# Patient Record
Sex: FEMALE | Race: WHITE | Employment: OTHER | URBAN - METROPOLITAN AREA
[De-identification: names, ages, dates, MRNs, and addresses within clinical notes are randomized per-mention and may not be internally consistent; named-entity substitution may affect disease eponyms.]

---

## 2019-06-11 PROBLEM — D51.8 MACROCYTIC ANEMIA WITH VITAMIN B12 DEFICIENCY: Status: ACTIVE | Noted: 2017-12-12

## 2019-06-11 PROBLEM — N39.41 URGE INCONTINENCE: Status: ACTIVE | Noted: 2019-02-28

## 2019-06-11 PROBLEM — I10 ESSENTIAL (PRIMARY) HYPERTENSION: Status: ACTIVE | Noted: 2017-09-22

## 2019-06-11 PROBLEM — E78.2 MIXED HYPERLIPIDEMIA: Status: ACTIVE | Noted: 2017-10-26

## 2019-06-11 PROBLEM — R35.1 NOCTURIA: Status: ACTIVE | Noted: 2019-01-17

## 2019-06-11 PROBLEM — F31.60 BIPOLAR AFFECTIVE DISORDER, CURRENT EPISODE MIXED (HCC): Status: ACTIVE | Noted: 2017-09-22

## 2019-06-11 PROBLEM — R39.15 URGENCY OF URINATION: Status: ACTIVE | Noted: 2018-06-04

## 2019-06-11 PROBLEM — D50.9 IRON DEFICIENCY ANEMIA: Status: ACTIVE | Noted: 2017-10-11

## 2019-06-11 PROBLEM — T50.905A DRUG-INDUCED MALABSORPTION: Status: ACTIVE | Noted: 2019-03-13

## 2019-06-11 PROBLEM — R35.0 FREQUENCY OF MICTURITION: Status: ACTIVE | Noted: 2019-01-17

## 2019-06-11 PROBLEM — R53.81 MALAISE AND FATIGUE: Status: ACTIVE | Noted: 2018-10-15

## 2019-06-11 PROBLEM — N20.0 RENAL CALCULUS: Status: ACTIVE | Noted: 2017-08-28

## 2019-06-11 PROBLEM — N39.0 UTI (URINARY TRACT INFECTION): Status: ACTIVE | Noted: 2017-08-07

## 2019-06-11 PROBLEM — B35.4 TINEA CORPORIS: Status: ACTIVE | Noted: 2019-04-24

## 2019-06-11 PROBLEM — E03.9 ACQUIRED HYPOTHYROIDISM: Status: ACTIVE | Noted: 2017-09-22

## 2019-06-11 PROBLEM — N39.3 SUI (STRESS URINARY INCONTINENCE, FEMALE): Status: ACTIVE | Noted: 2019-02-28

## 2019-06-11 PROBLEM — R53.83 MALAISE AND FATIGUE: Status: ACTIVE | Noted: 2018-10-15

## 2019-06-11 PROBLEM — K90.9 DRUG-INDUCED MALABSORPTION: Status: ACTIVE | Noted: 2019-03-13

## 2019-07-11 PROBLEM — N39.0 UTI (URINARY TRACT INFECTION): Status: RESOLVED | Noted: 2017-08-07 | Resolved: 2019-07-11

## 2024-04-08 ENCOUNTER — TELEPHONE (OUTPATIENT)
Age: 79
End: 2024-04-08

## 2024-04-08 NOTE — TELEPHONE ENCOUNTER
Dr. Busby,    When you get a moment can you place a referral for patient to our [facility]. She will be seeing Glenda Zuniga NP.    Thank you!